# Patient Record
Sex: FEMALE | Race: WHITE
[De-identification: names, ages, dates, MRNs, and addresses within clinical notes are randomized per-mention and may not be internally consistent; named-entity substitution may affect disease eponyms.]

---

## 2020-03-10 ENCOUNTER — HOSPITAL ENCOUNTER (EMERGENCY)
Dept: HOSPITAL 15 - ER | Age: 60
Discharge: HOME | End: 2020-03-10
Payer: COMMERCIAL

## 2020-03-10 VITALS — BODY MASS INDEX: 36.65 KG/M2 | HEIGHT: 65 IN | WEIGHT: 220 LBS

## 2020-03-10 VITALS — SYSTOLIC BLOOD PRESSURE: 130 MMHG | DIASTOLIC BLOOD PRESSURE: 76 MMHG

## 2020-03-10 DIAGNOSIS — M17.12: Primary | ICD-10-CM

## 2020-03-10 PROCEDURE — 73562 X-RAY EXAM OF KNEE 3: CPT

## 2022-05-04 ENCOUNTER — HOSPITAL ENCOUNTER (EMERGENCY)
Dept: HOSPITAL 15 - ER | Age: 62
Discharge: HOME | End: 2022-05-04
Payer: COMMERCIAL

## 2022-05-04 VITALS — HEIGHT: 65 IN | BODY MASS INDEX: 36.65 KG/M2 | WEIGHT: 220 LBS

## 2022-05-04 VITALS — SYSTOLIC BLOOD PRESSURE: 133 MMHG | DIASTOLIC BLOOD PRESSURE: 85 MMHG

## 2022-05-04 DIAGNOSIS — R22.42: Primary | ICD-10-CM

## 2022-05-04 LAB
ALBUMIN SERPL-MCNC: 3.7 G/DL (ref 3.4–5)
ALP SERPL-CCNC: 69 U/L (ref 45–117)
ALT SERPL-CCNC: 30 U/L (ref 13–56)
ANION GAP SERPL CALCULATED.3IONS-SCNC: 4 MMOL/L (ref 5–15)
BILIRUB SERPL-MCNC: 0.8 MG/DL (ref 0.2–1)
BUN SERPL-MCNC: 13 MG/DL (ref 7–18)
BUN/CREAT SERPL: 12
CALCIUM SERPL-MCNC: 8.9 MG/DL (ref 8.5–10.1)
CHLORIDE SERPL-SCNC: 107 MMOL/L (ref 98–107)
CO2 SERPL-SCNC: 30 MMOL/L (ref 21–32)
GLUCOSE SERPL-MCNC: 129 MG/DL (ref 74–106)
HCT VFR BLD AUTO: 42.1 % (ref 36–46)
HGB BLD-MCNC: 14.4 G/DL (ref 12.2–16.2)
MCH RBC QN AUTO: 30.4 PG (ref 28–32)
MCV RBC AUTO: 88.8 FL (ref 80–100)
NRBC BLD QL AUTO: 0 %
POTASSIUM SERPL-SCNC: 4.2 MMOL/L (ref 3.5–5.1)
PROT SERPL-MCNC: 7.2 G/DL (ref 6.4–8.2)
SODIUM SERPL-SCNC: 141 MMOL/L (ref 136–145)

## 2022-05-04 PROCEDURE — 80053 COMPREHEN METABOLIC PANEL: CPT

## 2022-05-04 PROCEDURE — 85025 COMPLETE CBC W/AUTO DIFF WBC: CPT

## 2022-05-04 PROCEDURE — 93971 EXTREMITY STUDY: CPT

## 2022-05-04 PROCEDURE — 93005 ELECTROCARDIOGRAM TRACING: CPT

## 2022-05-04 PROCEDURE — 36415 COLL VENOUS BLD VENIPUNCTURE: CPT

## 2023-02-10 ENCOUNTER — HOSPITAL ENCOUNTER (EMERGENCY)
Dept: HOSPITAL 15 - ER | Age: 63
Discharge: HOME | End: 2023-02-10
Payer: COMMERCIAL

## 2023-02-10 VITALS — SYSTOLIC BLOOD PRESSURE: 154 MMHG | DIASTOLIC BLOOD PRESSURE: 72 MMHG

## 2023-02-10 VITALS — BODY MASS INDEX: 36.73 KG/M2 | HEIGHT: 65 IN | WEIGHT: 220.46 LBS

## 2023-02-10 DIAGNOSIS — M17.12: Primary | ICD-10-CM

## 2023-02-10 DIAGNOSIS — Z98.890: ICD-10-CM

## 2023-02-10 PROCEDURE — 73562 X-RAY EXAM OF KNEE 3: CPT

## 2023-02-10 PROCEDURE — 96372 THER/PROPH/DIAG INJ SC/IM: CPT

## 2023-02-10 PROCEDURE — 99283 EMERGENCY DEPT VISIT LOW MDM: CPT

## 2023-05-01 ENCOUNTER — HOSPITAL ENCOUNTER (OUTPATIENT)
Dept: HOSPITAL 15 - CATH | Age: 63
Discharge: HOME | End: 2023-05-01
Attending: INTERNAL MEDICINE
Payer: COMMERCIAL

## 2023-05-01 VITALS — SYSTOLIC BLOOD PRESSURE: 104 MMHG | DIASTOLIC BLOOD PRESSURE: 51 MMHG

## 2023-05-01 VITALS — DIASTOLIC BLOOD PRESSURE: 56 MMHG | SYSTOLIC BLOOD PRESSURE: 95 MMHG

## 2023-05-01 VITALS — DIASTOLIC BLOOD PRESSURE: 52 MMHG | SYSTOLIC BLOOD PRESSURE: 104 MMHG

## 2023-05-01 VITALS — SYSTOLIC BLOOD PRESSURE: 98 MMHG | DIASTOLIC BLOOD PRESSURE: 54 MMHG

## 2023-05-01 VITALS — DIASTOLIC BLOOD PRESSURE: 54 MMHG | SYSTOLIC BLOOD PRESSURE: 92 MMHG

## 2023-05-01 VITALS — DIASTOLIC BLOOD PRESSURE: 50 MMHG | SYSTOLIC BLOOD PRESSURE: 87 MMHG

## 2023-05-01 VITALS — DIASTOLIC BLOOD PRESSURE: 60 MMHG | SYSTOLIC BLOOD PRESSURE: 99 MMHG

## 2023-05-01 VITALS — WEIGHT: 220 LBS | BODY MASS INDEX: 36.65 KG/M2 | HEIGHT: 65 IN

## 2023-05-01 DIAGNOSIS — R07.9: ICD-10-CM

## 2023-05-01 DIAGNOSIS — R94.30: ICD-10-CM

## 2023-05-01 DIAGNOSIS — I77.1: ICD-10-CM

## 2023-05-01 DIAGNOSIS — I25.118: Primary | ICD-10-CM

## 2023-05-01 PROCEDURE — 93458 L HRT ARTERY/VENTRICLE ANGIO: CPT

## 2023-05-01 PROCEDURE — 99152 MOD SED SAME PHYS/QHP 5/>YRS: CPT

## 2024-12-11 ENCOUNTER — HOSPITAL ENCOUNTER (EMERGENCY)
Dept: HOSPITAL 15 - ER | Age: 64
Discharge: HOME | End: 2024-12-11
Payer: COMMERCIAL

## 2024-12-11 VITALS
RESPIRATION RATE: 16 BRPM | SYSTOLIC BLOOD PRESSURE: 144 MMHG | HEART RATE: 87 BPM | OXYGEN SATURATION: 94 % | TEMPERATURE: 98.5 F | DIASTOLIC BLOOD PRESSURE: 95 MMHG

## 2024-12-11 VITALS — WEIGHT: 149.91 LBS | HEIGHT: 63 IN | BODY MASS INDEX: 26.56 KG/M2

## 2024-12-11 DIAGNOSIS — Z79.82: ICD-10-CM

## 2024-12-11 DIAGNOSIS — Z79.84: ICD-10-CM

## 2024-12-11 DIAGNOSIS — N30.01: Primary | ICD-10-CM

## 2024-12-11 DIAGNOSIS — Z79.899: ICD-10-CM

## 2024-12-11 LAB
ANION GAP SERPL CALCULATED.3IONS-SCNC: 7 MMOL/L (ref 5–15)
BUN SERPL-MCNC: 9 MG/DL (ref 9–23)
BUN/CREAT SERPL: 8.6 (ref 10–20)
CALCIUM SERPL-MCNC: 9.6 MG/DL (ref 8.7–10.4)
CELLS COUNTED: 100
CHLORIDE SERPL-SCNC: 104 MMOL/L (ref 98–107)
CO2 SERPL-SCNC: 30 MMOL/L (ref 20–31)
GLUCOSE SERPL-MCNC: 95 MG/DL (ref 74–106)
HCT VFR BLD AUTO: 36.3 % (ref 36–46)
HGB BLD-MCNC: 11.9 G/DL (ref 12.2–16.2)
MCH RBC QN AUTO: 26.9 PG (ref 28–32)
MCV RBC AUTO: 81.8 FL (ref 80–100)
POTASSIUM SERPL-SCNC: 4.6 MMOL/L (ref 3.5–5.1)
PROT UR STRIP.AUTO-MCNC: (no result) MG/DL
SODIUM SERPL-SCNC: 141 MMOL/L (ref 136–145)

## 2024-12-11 PROCEDURE — 80048 BASIC METABOLIC PNL TOTAL CA: CPT

## 2024-12-11 PROCEDURE — 74176 CT ABD & PELVIS W/O CONTRAST: CPT

## 2024-12-11 PROCEDURE — 36415 COLL VENOUS BLD VENIPUNCTURE: CPT

## 2024-12-11 PROCEDURE — 99285 EMERGENCY DEPT VISIT HI MDM: CPT

## 2024-12-11 PROCEDURE — 81001 URINALYSIS AUTO W/SCOPE: CPT

## 2024-12-11 PROCEDURE — 85007 BL SMEAR W/DIFF WBC COUNT: CPT

## 2024-12-11 PROCEDURE — 96365 THER/PROPH/DIAG IV INF INIT: CPT

## 2024-12-11 PROCEDURE — 85027 COMPLETE CBC AUTOMATED: CPT

## 2024-12-11 RX ADMIN — CEFTRIAXONE SODIUM ONE MLS/HR: 1 INJECTION, POWDER, FOR SOLUTION INTRAMUSCULAR; INTRAVENOUS at 23:03

## 2024-12-11 NOTE — ED.PDOC
History of Present Illness


HPI Comments


64-year-old female recently diagnosed with lymphoma presents with 3 days of 

dysuria, hematuria, and generally feeling unwell.  Patient's saw her regular 

doctor who prescribed Keflex.  She reports this has not helped her symptoms over

the last couple of days in fact they have worsened.  She talked to her doctor 

who recommended she come to the emergency room for evaluation.


Chief Complaint:  Urinary


Time Seen by MD:  16:09


Primary Care Provider:  Akil


Allergies:  


Coded Allergies:  


     NO KNOWN ALLERGIES (Unverified , 3/10/20)


Home Meds


Reported Medications


Metformin Hydrochloride (Metformin Hcl) 500 Mg Tab, 500 MG PO DAILY for 30 Days,

MG


   4/28/23


Levothyroxine Sodium (Levothyroxine Sodium) 50 Mcg Tab, 75 MCG PO QAM for 30 

Days, MCG


   4/28/23


Aspirin (Aspirin) 325 Mg Tab, 81 MG PO DAILY for 30 Days, MG


   4/28/23


Atorvastatin Calcium (ATORVASTATIN CALCIUM) 40 Mg Tab, 1 TAB PO DAILY, #30 TAB 5

Refills


   4/28/23


Amlodipine Besylate (Amlodipine Besylate) 5 Mg Tab, 10 MG PO DAILY for 30 Days, 

MG


   4/28/23


Mode of Arrival:  Ambulatory





Past Medical History


Past Medical History (Other):  


Lymphoma


GYN History:  No Pertinent GYN History





Family History


Family History:  Reviewed,noncontributory to illness





Social History


Smoker:  Non-Smoker


Alcohol:  Denies ETOH Use


Drugs:  Denies Drug Use


Lives In:  Home





Genitourinary:  reports: dysuria, frequency, hematuria


All Other Systems:  Reviewed and Negative





Physical Exam


General Appearance:  No Apparent Distress, Normal


HEENT:  Normal ENT Inspection, Pharynx Normal, TMs Normal


Neck:  Full Range of Motion, Non-Tender, Normal, Normal Inspection


Respiratory:  Chest Non-Tender, Lungs Clear, No Accessory Muscle Use, No 

Respiratory Distress, Normal Breath Sounds


Cardiovascular:  No Edema, No JVD, No Murmur, No Gallop, Normal Peripheral 

Pulses, Regular Rate/Rhythm


Breast Exam:  Deferred


Gastrointestinal:  No Organomegaly, Non Tender, No Pulsatile Mass, Normal Bowel 

Sounds, Soft


Genitalia:  Deferred


Pelvic:  Deferred


Rectal:  Deferred


Extremities:  No calf tenderness, Normal capillary refill, Normal inspection, 

Normal range of motion, Non-tender, No pedal edema


Musculoskeletal :  


   Apperance:  Normal


Neurologic:  Alert, CNs II-XII nml as Tested, No Motor Deficits, Normal Affect, 

Normal Mood, No Sensory Deficits


Cerebellar Function:  Normal


Reflexes:  Normal


Skin:  Dry, Normal Color, Warm


Lymphatic:  No Adenopathy





Was a procedure done?


Was a procedure done?:  No





Differential Dx


Considerations may include:


uti, nephritis, renal colic





X-Ray, Labs, Meds, VS





                                   Vital Signs








  Date Time  Temp Pulse Resp B/P (MAP) Pulse Ox O2 Delivery O2 Flow Rate FiO2


 


12/11/24 16:21 98.0 99 18 112/77 (89) 95   








                                       Lab








Test


 12/11/24


16:40 12/11/24


16:15 Range/Units


 


 


White Blood Count


 15.0 H


 


 4.4-10.8


10^3/uL


 


Red Blood Count


 4.44 


 


 4.0-5.20


10^6/uL


 


Hemoglobin 11.9 L  12.2-16.2  g/dL


 


Hematocrit 36.3   36.0-46.0  %


 


Mean Corpuscular Volume 81.8   80.0-100.0  fL


 


Mean Corpuscular Hemoglobin 26.9 L  28.0-32.0  pg


 


Mean Corpuscular Hemoglobin


Concent 32.9 


 


 32.0-36.0  g/dL





 


Red Cell Distribution Width 15.9 H  11.8-14.3  %


 


Platelet Count


 112 L


 


 140-450


10^3/uL


 


Mean Platelet Volume 8.2   6.9-10.8  fL


 


Neutrophils (%) (Auto)    37.0-80.0  %


 


Lymphocytes (%) (Auto)    10.0-50.0  %


 


Monocytes (%) (Auto)    0.0-12.0  %


 


Basophils (%) (Auto)    0.0-2.0  %


 


Neutrophils # (Auto)


  


 


 1.6-8.6  10


^3/uL


 


Lymphocytes # (Auto)


  


 


 0.4-5.4  10


^3/uL


 


Monocytes # (Auto)    0-1.3  10 ^3/uL


 


Differential Total Cells


Counted 100.0 


 


 100  





 


Neutrophils % (Manual) 19 L  37.0-80.0  


 


Band Neutrophils % (Manual) 0    


 


Lymphocytes % (Manual) 72 H  10.0-50.0  


 


Monocytes % (Manual) 4   0-12  


 


Eosinophils % (Manual) 1   0-7  


 


Basophils % (Manual) 0   0.0-2.0  


 


Metamyelocytes % (manual) 0    


 


Myelocytes % (Manual) 0    


 


Promyelocytes % (Manual) 0    


 


Blast Cells % (Manual) 0    


 


Reactive Lymphocytes 4    


 


Platelet Estimate Decreased    


 


Sodium Level 141   136-145  mmol/L


 


Potassium Level 4.6   3.5-5.1  mmol/L


 


Chloride Level 104     mmol/L


 


Carbon Dioxide Level 30   20-31  mmol/L


 


Anion Gap 7   5-15  


 


Blood Urea Nitrogen 9   9-23  mg/dL


 


Creatinine


 1.05 H


 


 0.550-1.02


mg/dL


 


Glomerular Filtration Rate


Calc 59 


 


 >90  mL/min





 


BUN/Creatinine Ratio 8.6 L  10.0-20.0  


 


Serum Glucose 95     mg/dL


 


Calcium Level 9.6   8.7-10.4  mg/dL


 


Urine Color  Light-red  Yellow  


 


Urine Clarity  Turbid H Clear  


 


Urine pH  6.5  5.0-9.0  


 


Urine Specific Gravity  1.005  1.001-1.035  


 


Urine Protein  2+ H Negative  


 


Urine Ketones  Trace  Negative  


 


Urine Blood  3+ H Negative  /uL


 


Urine Nitrite  Negative  Negative  


 


Urine Bilirubin  Negative  Negative  


 


Urine Urobilinogen  Normal  Negative  mg/dL


 


Urine Leukocyte Esterase  3+  Negative  /uL


 


Urine RBC  209  0 - 4  /hpf


 


Urine WBC  89  0 - 5  /hpf


 


Urine Squamous Epithelial


Cells 


 Few 


 <5  /hpf





 


Urine Bacteria  Few H None Seen  /hpf


 


Urine Glucose  Normal  Normal  mg/dL








Time of 1ST Reevaluation:  21:50


Reevaluation 1ST:  Improved


Patient Education/Counseling:  Diagnosis, Treatment


Family Education/Counseling:  No Family Present





Departure 1


Departure


Time of Disposition:  21:50 (Patient likely with cystitis.  Patient is scheduled

for a PET scan tomorrow and for further treatment for her lymphoma.)


Impression:  


   Primary Impression:  


   Acute cystitis with hematuria


Disposition:  01 HOME / SELF CARE / HOMELESS


Condition:  Stable


Written Prescriptions


You have a urinary tract infection.


Your prescribed antibiotics.


Please take as directed.


You can take Tylenol Motrin as needed for pain.


It is important that he follow up with the regular doctor within 1 week to 

ensure you are doing better.


If your symptoms worsen or you have any other concerns then please return to the

emergency room.


e-Prescriptions


Cefdinir (Cefdinir) 300 Mg Cap


1 CAP PO BID for 7 Days, #14 CAP


   Prov: JERRI MENDOZA MD         12/11/24


Discharged With:  Self





Critical Care Note


Critical Care Time?:  No





Stability


Stability form required:  No





Heart Score


Heart Score:  








Heart Score Response (Comments) Value


 


History N/A 0


 


EKG N/A 0


 


Age N/A 0


 


Risk Factors N/A 0


 


Troponin N/A 0


 


Total  0

















JERRI MENDOZA MD               Dec 11, 2024 21:52

## 2024-12-11 NOTE — [facility]
Procedure: CT CT AB PEL WO CON-NO ORAL OR IV  12/11/2024 07:54 PM



Indication: flank pain



Comparison Study: None available at time of dictation.



Technique: Axial images were obtained and reformatted in coronal and sagittal planes.



All CT scans at this medical facility are performed using dose modulation techniques as appropriate t
o a performed exam including the following: Automated exposure control was utilized; adjustment of th
e MA and/or KV according to patient size; and use of iterative reconstruction technique. 



CT Dose: CTDI volume is 18.16 mGy. Dose-length product is 1028.34 mGy*cm



FINDINGS: 



Lower Chest: Bibasilar subsegmental atelectasis is noted.



Hepatobiliary: Unremarkable.



Spleen: Severe splenomegaly, 22.4 cm in craniocaudal and 20 cm in AP without discrete lesion in this 
limited unenhanced study.  The spleen exerts mass effect on the adjacent structures including the lef
t kidney, stomach and the splenic flexure of the large bowel.



Pancreas: Unremarkable.



Adrenal Glands: Unremarkable.



 tract: The kidneys are normal in size bilaterally without hydronephrosis .  A 2 mm nonobstructing 
stone is seen in the lower pole of the left kidney.  The urinary bladder is unremarkable.



GI tract: The stomach is grossly normal in appearance. No evidence of small bowel obstruction. There 
is sigmoid diverticulosis without diverticulitis.  The appendix is normal. 



Lymphatics: Retroperitoneal, pelvic , inguinal femoral canal lymphadenopathy noted with lymph nodes m
easuring up to 2.9 x 1.8 cm.  Surgical clips are seen along the right common, external internal iliac
 chains which may reflect prior lymphadenectomy.



Vasculature: The abdominal aorta is normal in in caliber.



Pelvic Organs: Unremarkable



Bones/soft tissues: No acute abnormality. Multilevel degenerative changes of the lumbar spine noted. 
  



Other: None.



IMPRESSION:



1. Severe splenomegaly, retroperitoneal, iliac and femoral chain lymphadenopathy concerning for lymph
oproliferative disorders. Correlate with history.



2. The left kidney is displaced inferiorly and medially by the enlarged spleen.  There is moderate le
ft perinephric fat stranding but no hydronephrosis. Subcentimeter nonobstructing left renal stone not
ed.



3. Sigmoid diverticulosis without diverticulitis.



Electronically Signed by: Hallie Lorenzana at 12/11/2024 20:30:32 PM